# Patient Record
Sex: MALE | Race: WHITE | NOT HISPANIC OR LATINO | URBAN - METROPOLITAN AREA
[De-identification: names, ages, dates, MRNs, and addresses within clinical notes are randomized per-mention and may not be internally consistent; named-entity substitution may affect disease eponyms.]

---

## 2018-08-08 ENCOUNTER — OUTPATIENT (OUTPATIENT)
Dept: OUTPATIENT SERVICES | Facility: HOSPITAL | Age: 78
LOS: 1 days | End: 2018-08-08

## 2019-03-25 ENCOUNTER — OUTPATIENT (OUTPATIENT)
Dept: OUTPATIENT SERVICES | Facility: HOSPITAL | Age: 79
LOS: 1 days | End: 2019-03-25

## 2019-12-27 ENCOUNTER — OUTPATIENT (OUTPATIENT)
Dept: OUTPATIENT SERVICES | Facility: HOSPITAL | Age: 79
LOS: 1 days | End: 2019-12-27

## 2020-02-03 ENCOUNTER — APPOINTMENT (OUTPATIENT)
Dept: RADIOLOGY | Facility: CLINIC | Age: 80
End: 2020-02-03
Payer: MEDICARE

## 2020-02-03 PROCEDURE — 71046 X-RAY EXAM CHEST 2 VIEWS: CPT

## 2020-02-06 ENCOUNTER — OUTPATIENT (OUTPATIENT)
Dept: OUTPATIENT SERVICES | Facility: HOSPITAL | Age: 80
LOS: 1 days | End: 2020-02-06

## 2020-06-23 ENCOUNTER — OUTPATIENT (OUTPATIENT)
Dept: OUTPATIENT SERVICES | Facility: HOSPITAL | Age: 80
LOS: 1 days | End: 2020-06-23

## 2020-07-22 ENCOUNTER — OUTPATIENT (OUTPATIENT)
Dept: OUTPATIENT SERVICES | Facility: HOSPITAL | Age: 80
LOS: 1 days | End: 2020-07-22

## 2020-08-02 ENCOUNTER — INPATIENT (INPATIENT)
Facility: HOSPITAL | Age: 80
LOS: 0 days | Discharge: HOME | End: 2020-08-03
Attending: STUDENT IN AN ORGANIZED HEALTH CARE EDUCATION/TRAINING PROGRAM | Admitting: EMERGENCY MEDICINE
Payer: MEDICARE

## 2020-08-02 ENCOUNTER — OUTPATIENT (OUTPATIENT)
Dept: OUTPATIENT SERVICES | Facility: HOSPITAL | Age: 80
LOS: 1 days | End: 2020-08-02

## 2020-08-02 PROCEDURE — 99284 EMERGENCY DEPT VISIT MOD MDM: CPT | Mod: CS

## 2020-08-02 PROCEDURE — 71045 X-RAY EXAM CHEST 1 VIEW: CPT | Mod: 26

## 2020-08-02 PROCEDURE — 71275 CT ANGIOGRAPHY CHEST: CPT | Mod: 26

## 2020-08-02 PROCEDURE — 74175 CTA ABDOMEN W/CONTRAST: CPT | Mod: 26

## 2020-08-03 ENCOUNTER — OUTPATIENT (OUTPATIENT)
Dept: OUTPATIENT SERVICES | Facility: HOSPITAL | Age: 80
LOS: 1 days | End: 2020-08-03

## 2020-09-30 ENCOUNTER — OUTPATIENT (OUTPATIENT)
Dept: OUTPATIENT SERVICES | Facility: HOSPITAL | Age: 80
LOS: 1 days | End: 2020-09-30

## 2020-11-16 ENCOUNTER — APPOINTMENT (OUTPATIENT)
Dept: UROLOGY | Facility: CLINIC | Age: 80
End: 2020-11-16

## 2020-11-16 PROBLEM — Z00.00 ENCOUNTER FOR PREVENTIVE HEALTH EXAMINATION: Status: ACTIVE | Noted: 2020-11-16

## 2020-11-18 ENCOUNTER — APPOINTMENT (OUTPATIENT)
Dept: UROLOGY | Facility: CLINIC | Age: 80
End: 2020-11-18
Payer: MEDICARE

## 2020-11-18 ENCOUNTER — APPOINTMENT (OUTPATIENT)
Dept: FAMILY MEDICINE | Facility: CLINIC | Age: 80
End: 2020-11-18
Payer: MEDICARE

## 2020-11-18 VITALS
HEART RATE: 78 BPM | WEIGHT: 180 LBS | DIASTOLIC BLOOD PRESSURE: 86 MMHG | HEIGHT: 70 IN | BODY MASS INDEX: 25.77 KG/M2 | SYSTOLIC BLOOD PRESSURE: 152 MMHG

## 2020-11-18 VITALS — TEMPERATURE: 97.4 F

## 2020-11-18 DIAGNOSIS — Z87.898 PERSONAL HISTORY OF OTHER SPECIFIED CONDITIONS: ICD-10-CM

## 2020-11-18 DIAGNOSIS — Z86.79 PERSONAL HISTORY OF OTHER DISEASES OF THE CIRCULATORY SYSTEM: ICD-10-CM

## 2020-11-18 DIAGNOSIS — Z87.09 PERSONAL HISTORY OF OTHER DISEASES OF THE RESPIRATORY SYSTEM: ICD-10-CM

## 2020-11-18 DIAGNOSIS — Z80.42 FAMILY HISTORY OF MALIGNANT NEOPLASM OF PROSTATE: ICD-10-CM

## 2020-11-18 LAB
BILIRUB UR QL STRIP: NEGATIVE
CLARITY UR: CLEAR
COLLECTION METHOD: NORMAL
GLUCOSE UR-MCNC: NEGATIVE
HCG UR QL: 0.2 EU/DL
HGB UR QL STRIP.AUTO: NEGATIVE
KETONES UR-MCNC: NEGATIVE
LEUKOCYTE ESTERASE UR QL STRIP: NORMAL
NITRITE UR QL STRIP: NEGATIVE
PH UR STRIP: 6
PROT UR STRIP-MCNC: NEGATIVE
SP GR UR STRIP: 1.02

## 2020-11-18 PROCEDURE — 99204 OFFICE O/P NEW MOD 45 MIN: CPT | Mod: 25

## 2020-11-18 PROCEDURE — 81003 URINALYSIS AUTO W/O SCOPE: CPT | Mod: QW

## 2020-11-18 PROCEDURE — 36415 COLL VENOUS BLD VENIPUNCTURE: CPT

## 2020-11-18 NOTE — REVIEW OF SYSTEMS
[Shortness Of Breath] : shortness of breath [Wheezing] : wheezing [Genital bacterial infection] : genital bacterial infection [Genital yeast infection] : genital yeast infection [Sexually Transmitted Disease (STD)] : sexually transmitted disease [Poor quality erections] : Poor quality erections [Wake up at night to urinate  How many times?  ___] : wakes up to urinate [unfilled] times during the night [Strong urge to urinate] : strong urge to urinate [Interrupted urine stream] : interrupted urine stream [Negative] : Heme/Lymph [see HPI] : see HPI

## 2020-11-18 NOTE — LETTER BODY
[Dear  ___] : Dear  [unfilled], [Consult Letter:] : I had the pleasure of evaluating your patient, [unfilled]. [Please see my note below.] : Please see my note below. [Consult Closing:] : Thank you very much for allowing me to participate in the care of this patient.  If you have any questions, please do not hesitate to contact me. [Sincerely,] : Sincerely, [FreeTextEntry3] : Miguel Sheehan, DO\par Genitourinary Medicine\par

## 2020-11-18 NOTE — HISTORY OF PRESENT ILLNESS
[FreeTextEntry1] : Mr. CARRIE CHAVARRIA 80 year old  M  PMH COPD, GERD, HTN  and PSH aortic aneurysm, lung cancer s/p lobectomy. Pt sent by PCP for elevated for PSA. Pt had prostate biopsy about 30 years ago which was negative. Pt having urinary freq. Pt urinates 5-6 times a day. Nocturia x3. Stream varies. Has some intermittency. Denies hesitancy. Positive urgency. UA shows no signs of infection. 3/5 brothers have prostate cancer. Oldest diagnosed at 76 and youngest diagnosed in 60's. Former smoker. Stopped 25 years ago. Smoked for about 15-20 years. \par \par 11/4/19 PSA 6.0\par 9/30/20 PSA 7.62

## 2020-11-18 NOTE — PHYSICAL EXAM
[General Appearance - Well Developed] : well developed [General Appearance - Well Nourished] : well nourished [Normal Appearance] : normal appearance [Well Groomed] : well groomed [General Appearance - In No Acute Distress] : no acute distress [Edema] : no peripheral edema [Respiration, Rhythm And Depth] : normal respiratory rhythm and effort [Exaggerated Use Of Accessory Muscles For Inspiration] : no accessory muscle use [Abdomen Soft] : soft [Abdomen Tenderness] : non-tender [Costovertebral Angle Tenderness] : no ~M costovertebral angle tenderness [Urethral Meatus] : meatus normal [Penis Abnormality] : normal circumcised penis [Urinary Bladder Findings] : the bladder was normal on palpation [Scrotum] : the scrotum was normal [Testes Mass (___cm)] : there were no testicular masses [Prostate Tenderness] : the prostate was not tender [No Prostate Nodules] : no prostate nodules [Prostate Size ___ gm] : prostate size [unfilled] gm [Normal Station and Gait] : the gait and station were normal for the patient's age [] : no rash [No Focal Deficits] : no focal deficits [Oriented To Time, Place, And Person] : oriented to person, place, and time [Affect] : the affect was normal [Mood] : the mood was normal [Not Anxious] : not anxious

## 2020-11-20 ENCOUNTER — NON-APPOINTMENT (OUTPATIENT)
Age: 80
End: 2020-11-20

## 2020-11-20 LAB
PSA FREE FLD-MCNC: 25 %
PSA FREE SERPL-MCNC: 1.97 NG/ML
PSA SERPL-MCNC: 7.88 NG/ML

## 2020-12-08 ENCOUNTER — OUTPATIENT (OUTPATIENT)
Dept: OUTPATIENT SERVICES | Facility: HOSPITAL | Age: 80
LOS: 1 days | End: 2020-12-08
Payer: MEDICARE

## 2020-12-08 ENCOUNTER — RESULT REVIEW (OUTPATIENT)
Age: 80
End: 2020-12-08

## 2020-12-08 ENCOUNTER — APPOINTMENT (OUTPATIENT)
Dept: MRI IMAGING | Facility: CLINIC | Age: 80
End: 2020-12-08
Payer: MEDICARE

## 2020-12-08 DIAGNOSIS — R97.20 ELEVATED PROSTATE SPECIFIC ANTIGEN [PSA]: ICD-10-CM

## 2020-12-08 PROCEDURE — 72197 MRI PELVIS W/O & W/DYE: CPT | Mod: 26

## 2020-12-08 PROCEDURE — 76377 3D RENDER W/INTRP POSTPROCES: CPT | Mod: 26

## 2020-12-08 PROCEDURE — 76377 3D RENDER W/INTRP POSTPROCES: CPT

## 2020-12-08 PROCEDURE — 72197 MRI PELVIS W/O & W/DYE: CPT

## 2020-12-08 PROCEDURE — A9585: CPT

## 2020-12-16 ENCOUNTER — APPOINTMENT (OUTPATIENT)
Dept: UROLOGY | Facility: CLINIC | Age: 80
End: 2020-12-16
Payer: MEDICARE

## 2020-12-16 VITALS
BODY MASS INDEX: 25.77 KG/M2 | HEIGHT: 70 IN | TEMPERATURE: 97.3 F | WEIGHT: 180 LBS | HEART RATE: 65 BPM | SYSTOLIC BLOOD PRESSURE: 127 MMHG | DIASTOLIC BLOOD PRESSURE: 76 MMHG

## 2020-12-16 PROCEDURE — 99213 OFFICE O/P EST LOW 20 MIN: CPT

## 2020-12-16 NOTE — ASSESSMENT
[FreeTextEntry1] : Pt does not wish to have biopsy at this time and would like to watch his PSA instead. Long discussion discussed with pt about biopsy vs repeating PSA. \par \par Plan\par PSA 3 months\par fu 3 months

## 2021-01-14 ENCOUNTER — RX RENEWAL (OUTPATIENT)
Age: 81
End: 2021-01-14

## 2021-01-14 RX ORDER — TAMSULOSIN HYDROCHLORIDE 0.4 MG/1
0.4 CAPSULE ORAL
Qty: 30 | Refills: 3 | Status: ACTIVE | COMMUNITY
Start: 2020-11-18 | End: 1900-01-01

## 2021-02-04 ENCOUNTER — OUTPATIENT (OUTPATIENT)
Dept: OUTPATIENT SERVICES | Facility: HOSPITAL | Age: 81
LOS: 1 days | End: 2021-02-04

## 2021-03-09 ENCOUNTER — OUTPATIENT (OUTPATIENT)
Dept: OUTPATIENT SERVICES | Facility: HOSPITAL | Age: 81
LOS: 1 days | End: 2021-03-09

## 2021-03-16 ENCOUNTER — APPOINTMENT (OUTPATIENT)
Dept: UROLOGY | Facility: CLINIC | Age: 81
End: 2021-03-16

## 2021-03-26 ENCOUNTER — APPOINTMENT (OUTPATIENT)
Dept: UROLOGY | Facility: CLINIC | Age: 81
End: 2021-03-26
Payer: MEDICARE

## 2021-03-26 VITALS
SYSTOLIC BLOOD PRESSURE: 165 MMHG | TEMPERATURE: 97.3 F | DIASTOLIC BLOOD PRESSURE: 74 MMHG | HEART RATE: 54 BPM | HEIGHT: 70 IN | BODY MASS INDEX: 32.93 KG/M2 | WEIGHT: 230 LBS

## 2021-03-26 DIAGNOSIS — N53.14 RETROGRADE EJACULATION: ICD-10-CM

## 2021-03-26 PROCEDURE — 99214 OFFICE O/P EST MOD 30 MIN: CPT

## 2021-03-26 NOTE — HISTORY OF PRESENT ILLNESS
[FreeTextEntry1] : Pt comes in follow up elevated PSA and MRI of prostate. 3 brothers had prostate cancer. Pt is happy with his urination on flomax. Pt having symptoms of retrograde ejaculation but this is not bothersome to the pt. Pt has not repeated his PSA yet. Pt gets flushing from taking viagra. \par \par 11/4/19 PSA 6.0\par 9/30/20 PSA 7.62 \par 11/18/20 PSA 7.88 % free 25 30% risk of cancer\par 12/8/20 MRI shows PIRADS 3 lesion Prostate 79 cc\par

## 2021-03-26 NOTE — ASSESSMENT
[FreeTextEntry1] : Pt still does not wish to have biopsy at this time and would like to watch his PSA instead. Long discussion discussed with pt about biopsy vs repeating PSA. \par \par Plan\par follow up with cardiology prior to starting viagra\par c/w flomax\par PSA \par fu 1 month

## 2021-04-13 ENCOUNTER — APPOINTMENT (OUTPATIENT)
Dept: UROLOGY | Facility: CLINIC | Age: 81
End: 2021-04-13
Payer: MEDICARE

## 2021-04-13 VITALS
HEART RATE: 57 BPM | DIASTOLIC BLOOD PRESSURE: 59 MMHG | HEIGHT: 70 IN | WEIGHT: 230 LBS | SYSTOLIC BLOOD PRESSURE: 121 MMHG | TEMPERATURE: 97.8 F | BODY MASS INDEX: 32.93 KG/M2

## 2021-04-13 DIAGNOSIS — N52.9 MALE ERECTILE DYSFUNCTION, UNSPECIFIED: ICD-10-CM

## 2021-04-13 PROCEDURE — 99214 OFFICE O/P EST MOD 30 MIN: CPT

## 2021-04-13 NOTE — HISTORY OF PRESENT ILLNESS
[FreeTextEntry1] : Pt comes in follow up elevated PSA. \par \par 11/4/19 PSA 6.0\par 9/30/20 PSA 7.62 \par 11/18/20 PSA 7.88 % free 25 30% risk of cancer\par 12/8/20 MRI shows PIRADS 3 lesion Prostate 79 cc\par 3/29/21 PSA 6.4 %free 24

## 2021-04-13 NOTE — ASSESSMENT
[FreeTextEntry1] : Pt does not wish to have biopsy at this time and would like to watch his PSA instead. Long discussion discussed with pt about biopsy vs repeating PSA. \par \par Plan\par fu with cardio about starting sildenafil\par c/w flomax\par PSA 3 months\par fu 3 months

## 2021-04-15 RX ORDER — SILDENAFIL 20 MG/1
20 TABLET ORAL DAILY
Qty: 30 | Refills: 3 | Status: ACTIVE | COMMUNITY
Start: 2021-04-15 | End: 1900-01-01

## 2021-04-17 ENCOUNTER — OUTPATIENT (OUTPATIENT)
Dept: OUTPATIENT SERVICES | Facility: HOSPITAL | Age: 81
LOS: 1 days | End: 2021-04-17

## 2021-04-18 ENCOUNTER — INPATIENT (INPATIENT)
Facility: HOSPITAL | Age: 81
LOS: 1 days | Discharge: HOME | End: 2021-04-20
Payer: MEDICARE

## 2021-04-18 ENCOUNTER — OUTPATIENT (OUTPATIENT)
Dept: OUTPATIENT SERVICES | Facility: HOSPITAL | Age: 81
LOS: 1 days | End: 2021-04-18

## 2021-04-18 PROCEDURE — 99285 EMERGENCY DEPT VISIT HI MDM: CPT | Mod: CS

## 2021-04-18 PROCEDURE — 93010 ELECTROCARDIOGRAM REPORT: CPT

## 2021-04-18 PROCEDURE — 71275 CT ANGIOGRAPHY CHEST: CPT | Mod: 26

## 2021-04-18 PROCEDURE — 76700 US EXAM ABDOM COMPLETE: CPT | Mod: 26

## 2021-04-18 PROCEDURE — 70450 CT HEAD/BRAIN W/O DYE: CPT | Mod: 26

## 2021-04-18 PROCEDURE — 71045 X-RAY EXAM CHEST 1 VIEW: CPT | Mod: 26

## 2021-04-18 PROCEDURE — 93880 EXTRACRANIAL BILAT STUDY: CPT | Mod: 26

## 2021-04-19 ENCOUNTER — OUTPATIENT (OUTPATIENT)
Dept: OUTPATIENT SERVICES | Facility: HOSPITAL | Age: 81
LOS: 1 days | End: 2021-04-19

## 2021-04-20 ENCOUNTER — OUTPATIENT (OUTPATIENT)
Dept: OUTPATIENT SERVICES | Facility: HOSPITAL | Age: 81
LOS: 1 days | End: 2021-04-20

## 2021-04-22 ENCOUNTER — APPOINTMENT (OUTPATIENT)
Dept: ORTHOPEDIC SURGERY | Facility: CLINIC | Age: 81
End: 2021-04-22
Payer: MEDICARE

## 2021-04-22 VITALS — HEIGHT: 70 IN | WEIGHT: 230 LBS | TEMPERATURE: 98.4 F | BODY MASS INDEX: 32.93 KG/M2

## 2021-04-22 DIAGNOSIS — M65.4 RADIAL STYLOID TENOSYNOVITIS [DE QUERVAIN]: ICD-10-CM

## 2021-04-22 DIAGNOSIS — M18.12 UNILATERAL PRIMARY OSTEOARTHRITIS OF FIRST CARPOMETACARPAL JOINT, LEFT HAND: ICD-10-CM

## 2021-04-22 PROCEDURE — 73110 X-RAY EXAM OF WRIST: CPT | Mod: LT

## 2021-04-22 PROCEDURE — 20550 NJX 1 TENDON SHEATH/LIGAMENT: CPT | Mod: LT

## 2021-04-22 PROCEDURE — 99204 OFFICE O/P NEW MOD 45 MIN: CPT | Mod: 25

## 2021-04-22 PROCEDURE — 73130 X-RAY EXAM OF HAND: CPT | Mod: LT

## 2021-04-22 RX ADMIN — Medication %: at 00:00

## 2021-04-22 RX ADMIN — BETAMETHASONE ACETATE AND BETAMETHASONE SODIUM PHOSPHATE 6MG/ML PRESERVATIVE FREE MG/ML: 3; 3 INJECTION, SUSPENSION EPIDURAL; INTRAMUSCULAR; INTRASPINAL; INTRATHECAL at 00:00

## 2021-04-22 NOTE — ADDENDUM
[FreeTextEntry1] : This note was written by Sandrine Salcido on 04/22/2021 acting solely as a scribe for Dr. Carlos Field.\par \par All medical record entries made by the scribe were at my, Dr. Carlos Field, direction and personally dictated by me on 04/22/2021. I have personally reviewed the chart and agree that the record accurately reflects my personal performance of the history, physical exam, assessment, and plan

## 2021-04-22 NOTE — PROCEDURE
[FreeTextEntry1] : -  After a discussion of risks and benefits, the patient agreed to proceed with a cortisone injection.  \par -  Side: Left first dorsal compartment.\par -  Medications injected: 1 cc of 1% Lidocaine and 1 cc of Celestone Soluspan, 6mg/cc, using sterile technique.\par -  Patient tolerated the procedure well, without complications.\par -  Immediate improvement of the symptoms, secondary to the anesthetic effects of the injection, was noted.\par -  Instructions:  The patient was told that the symptoms should hopefully begin to improve within the next several days. The use of ice, anti-inflammatory agents or Tylenol, and  activity modification was discussed. \par -  Follow-up: Within 4 weeks to assess response to the injection.

## 2021-04-22 NOTE — HISTORY OF PRESENT ILLNESS
[Right] : right hand dominant [FreeTextEntry1] : He comes in today for evaluation of left thumb pain which started about 2 months ago. He notes his pain is most significant in the morning. He also has stiffness in his thumb. He denies injury or any prior treatment. He rates his pain a 1 out of 10 at this time. \par \par He has a history of lung cancer and uses oxygen. He notes the pain is worse in the morning.\par \par He was referred by Dr. Murguia and Dr. Rodríguez.

## 2021-04-22 NOTE — PHYSICAL EXAM
[de-identified] : -Constitutional: This is a male in not obvious distress.\par -Psych: Patient is alert and oriented to person, place and time. Patient has a normal mood and affect.\par -Cardiovascular: Normal pulses throughout the upper extremities. No significant varicosities are noted in the upper extremities.\par -Neuro: Strength and sensation are intact throughout the upper extremities. Patient has normal coordination.\par -Respiratory: Patient exhibits no evidence of shortness of breath or difficulty breathing.\par -Skin: No rashes, lesions, or other abnormalities are noted in the upper extremities.\par \par ---\par \par Examination of his left thumb and hand demonstrates no swelling or tenderness along the A1 pulley or evidence of a trigger finger.  There is some swelling and tenderness along the CMC joint of the thumb.  However, he is more tender along the first dorsal compartment.  He has a positive Finkelstein sign.  There is a palpable cyst along the first dorsal compartment.  He is neurovascularly intact distally. 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

## 2021-04-22 NOTE — CONSULT LETTER
[Dear  ___] : Dear  [unfilled], [Consult Letter:] : I had the pleasure of evaluating your patient, [unfilled]. [Please see my note below.] : Please see my note below. [Consult Closing:] : Thank you very much for allowing me to participate in the care of this patient.  If you have any questions, please do not hesitate to contact me. [Sincerely,] : Sincerely, [DrOrion  ___] : Dr. BAER [FreeTextEntry3] : Carlos Field M.D.\par Surgery of the Hand and Upper Extremity\par Orthopaedic Surgery\par Chief, Hand Service, [facility]\par Director, Hand Service, \par  of Orthopedic Surgery, [facility] [employer] at [facility]\par [email or web address]: [email or web address] Office Phone: [phone]

## 2021-04-22 NOTE — DISCUSSION/SUMMARY
[FreeTextEntry1] : He has findings consistent with left thumb and wrist pain secondary to de Quervain's tendinitis.  He does also has symptoms secondary to CMC joint arthritis, but I do believe that the majority of his symptoms are secondary to the de Quervain's tendinitis.\par \par I had a discussion regarding today's visit, the diagnosis, and treatment recommendations / options. At this time, we discussed observation of his symptoms, a cortisone injection or surgery. He agreed to proceed with a cortisone injection at the first dorsal compartment.\par \par The patient has agreed to this plan of management and has expressed full understanding.  All questions were fully answered to the patient's satisfaction.\par \par I time spent on this visit included: Preparation for the visit, review of the medical records, review of pertinent diagnostic studies, examination and counseling of the patient on the above diagnosis, treatment plan and prognosis, orders of diagnostic tests, medication and/or appropriate procedures and documentation in the medical records of today's visit.

## 2021-04-27 ENCOUNTER — NON-APPOINTMENT (OUTPATIENT)
Age: 81
End: 2021-04-27

## 2021-07-13 ENCOUNTER — APPOINTMENT (OUTPATIENT)
Dept: UROLOGY | Facility: CLINIC | Age: 81
End: 2021-07-13
Payer: MEDICARE

## 2021-07-13 VITALS
SYSTOLIC BLOOD PRESSURE: 152 MMHG | HEIGHT: 70 IN | DIASTOLIC BLOOD PRESSURE: 66 MMHG | HEART RATE: 56 BPM | TEMPERATURE: 97.3 F | BODY MASS INDEX: 32.93 KG/M2 | WEIGHT: 230 LBS

## 2021-07-13 PROCEDURE — 99214 OFFICE O/P EST MOD 30 MIN: CPT

## 2021-07-13 NOTE — ASSESSMENT
[FreeTextEntry1] : Pt does not wish to have biopsy at this time and would like to watch his PSA instead. Long discussion discussed with pt about biopsy vs repeating PSA. Also discussed repeating MRI and at this time wants to wait 3 months\par \par Plan\par c/w flomax\par PSA 3 months\par fu 3 months

## 2021-07-13 NOTE — HISTORY OF PRESENT ILLNESS
[FreeTextEntry1] : Pt comes in follow up elevated PSA. \par \par 11/4/19 PSA 6.0\par 9/30/20 PSA 7.62 \par 11/18/20 PSA 7.88 % free 25 30% risk of cancer\par 12/8/20 MRI shows PIRADS 3 lesion Prostate 79 cc\par 3/29/21 PSA 6.4 %free 24\par 7/9/21 PSA 5.5 %free 21

## 2021-10-13 ENCOUNTER — NON-APPOINTMENT (OUTPATIENT)
Age: 81
End: 2021-10-13

## 2021-10-19 ENCOUNTER — APPOINTMENT (OUTPATIENT)
Dept: UROLOGY | Facility: CLINIC | Age: 81
End: 2021-10-19
Payer: MEDICARE

## 2021-10-19 VITALS
BODY MASS INDEX: 32.93 KG/M2 | TEMPERATURE: 97.8 F | HEART RATE: 83 BPM | SYSTOLIC BLOOD PRESSURE: 142 MMHG | DIASTOLIC BLOOD PRESSURE: 77 MMHG | WEIGHT: 230 LBS | HEIGHT: 70 IN

## 2021-10-19 DIAGNOSIS — R97.20 ELEVATED PROSTATE, SPECIFIC ANTIGEN [PSA]: ICD-10-CM

## 2021-10-19 PROCEDURE — 99214 OFFICE O/P EST MOD 30 MIN: CPT

## 2021-10-19 NOTE — HISTORY OF PRESENT ILLNESS
[FreeTextEntry1] : Pt comes in follow up elevated PSA. \par \par 11/4/19 PSA 6.0\par 9/30/20 PSA 7.62 \par 11/18/20 PSA 7.88 % free 25 30% risk of cancer\par 12/8/20 MRI shows PIRADS 3 lesion Prostate 79 cc\par 3/29/21 PSA 6.4 %free 24\par 7/9/21 PSA 5.5 %free 21\par 10/12/21 PSA  7.5 %free 17.6

## 2021-11-09 ENCOUNTER — OUTPATIENT (OUTPATIENT)
Dept: OUTPATIENT SERVICES | Facility: HOSPITAL | Age: 81
LOS: 1 days | End: 2021-11-09
Payer: MEDICARE

## 2021-11-09 ENCOUNTER — RESULT REVIEW (OUTPATIENT)
Age: 81
End: 2021-11-09

## 2021-11-09 ENCOUNTER — APPOINTMENT (OUTPATIENT)
Dept: MRI IMAGING | Facility: CLINIC | Age: 81
End: 2021-11-09
Payer: MEDICARE

## 2021-11-09 DIAGNOSIS — R97.20 ELEVATED PROSTATE SPECIFIC ANTIGEN [PSA]: ICD-10-CM

## 2021-11-09 PROCEDURE — A9585: CPT

## 2021-11-09 PROCEDURE — 76377 3D RENDER W/INTRP POSTPROCES: CPT

## 2021-11-09 PROCEDURE — 72197 MRI PELVIS W/O & W/DYE: CPT | Mod: 26,MG

## 2021-11-09 PROCEDURE — 76377 3D RENDER W/INTRP POSTPROCES: CPT | Mod: 26

## 2021-11-09 PROCEDURE — G1004: CPT

## 2021-11-09 PROCEDURE — 72197 MRI PELVIS W/O & W/DYE: CPT | Mod: MG

## 2021-11-11 ENCOUNTER — APPOINTMENT (OUTPATIENT)
Dept: UROLOGY | Facility: CLINIC | Age: 81
End: 2021-11-11

## 2021-11-18 ENCOUNTER — APPOINTMENT (OUTPATIENT)
Dept: RADIOLOGY | Facility: CLINIC | Age: 81
End: 2021-11-18

## 2021-11-18 ENCOUNTER — APPOINTMENT (OUTPATIENT)
Dept: RADIOLOGY | Facility: CLINIC | Age: 81
End: 2021-11-18
Payer: MEDICARE

## 2021-11-18 ENCOUNTER — APPOINTMENT (OUTPATIENT)
Dept: ULTRASOUND IMAGING | Facility: CLINIC | Age: 81
End: 2021-11-18
Payer: MEDICARE

## 2021-11-18 PROCEDURE — 93970 EXTREMITY STUDY: CPT

## 2021-11-18 PROCEDURE — 72050 X-RAY EXAM NECK SPINE 4/5VWS: CPT

## 2021-11-18 PROCEDURE — 72110 X-RAY EXAM L-2 SPINE 4/>VWS: CPT

## 2021-11-18 PROCEDURE — 93925 LOWER EXTREMITY STUDY: CPT

## 2021-11-23 ENCOUNTER — APPOINTMENT (OUTPATIENT)
Dept: UROLOGY | Facility: CLINIC | Age: 81
End: 2021-11-23
Payer: MEDICARE

## 2021-11-23 VITALS
BODY MASS INDEX: 32.93 KG/M2 | HEIGHT: 70 IN | DIASTOLIC BLOOD PRESSURE: 96 MMHG | SYSTOLIC BLOOD PRESSURE: 168 MMHG | HEART RATE: 77 BPM | TEMPERATURE: 97.7 F | WEIGHT: 230 LBS

## 2021-11-23 PROCEDURE — 99214 OFFICE O/P EST MOD 30 MIN: CPT

## 2021-11-23 NOTE — ASSESSMENT
[FreeTextEntry1] :  81  year old male with COPD on an O2 machine, hx of lung cancer followed with [facility] [place], and a hx of elevated PSA of: \par 7/9/21 PSA 5.5 %free 21\par 10/12/21 PSA  7.5 %free 17.6\par Had biopsy prostate 30 years ago: neg for cancer\par MRI 2021:\par Pros volume: 62 cc // PSA density: 0.12\par PIRADS 3 lesion right posterior: no change since 12/8/2020\par RAMONA: smooth, 4x4, no real nodules\par ++ Family History of prostate cancer in brothers\par \par Plan: \par Due to patient's frailty, comorbidities, COPD, and morbidities:\par Discontinue all investigations regarding prostate cancer. No need for biopsy or MRI's.\par this PSA may well be volume dirven due to BPH\par continue tamsulosin\par PSA in 1 year and RTC\par \par \par

## 2021-11-23 NOTE — HISTORY OF PRESENT ILLNESS
[FreeTextEntry1] : 82 yo male Pt comes in as a referral for follow up of elevated PSA. \par \par 11/4/19 PSA 6.0\par 9/30/20 PSA 7.62 \par 11/18/20 PSA 7.88 % free 25 30% risk of cancer\par 12/8/20 MRI shows PIRADS 3 lesion Prostate 79 cc\par 3/29/21 PSA 6.4 %free 24\par 7/9/21 PSA 5.5 %free 21\par 10/12/21 PSA  7.5 %free 17.6\par \par Had biopsy prostate 30 years ago: neg for cancer\par MRI 2021:\par Pros volume: 62 cc // PSA density: 0.12\par PIRADS 3 lesion right posterior: no change since 12/8/2020

## 2022-04-14 ENCOUNTER — APPOINTMENT (OUTPATIENT)
Dept: CT IMAGING | Facility: CLINIC | Age: 82
End: 2022-04-14
Payer: MEDICARE

## 2022-04-14 PROCEDURE — 70498 CT ANGIOGRAPHY NECK: CPT | Mod: MH

## 2022-04-14 PROCEDURE — 70496 CT ANGIOGRAPHY HEAD: CPT | Mod: MH

## 2022-04-14 PROCEDURE — 70481 CT ORBIT/EAR/FOSSA W/DYE: CPT | Mod: MH

## 2022-10-07 ENCOUNTER — TRANSCRIPTION ENCOUNTER (OUTPATIENT)
Age: 82
End: 2022-10-07

## 2022-10-08 ENCOUNTER — OUTPATIENT (OUTPATIENT)
Dept: OUTPATIENT SERVICES | Facility: HOSPITAL | Age: 82
LOS: 1 days | End: 2022-10-08

## 2022-10-08 DIAGNOSIS — U07.1 COVID-19: ICD-10-CM

## 2022-11-28 ENCOUNTER — APPOINTMENT (OUTPATIENT)
Dept: UROLOGY | Facility: CLINIC | Age: 82
End: 2022-11-28

## 2023-01-18 PROBLEM — H53.023 AMBLYOPIA REFRACTIVE; BOTH EYES: Status: ACTIVE | Noted: 2023-01-18

## 2023-01-30 ENCOUNTER — OFFICE (OUTPATIENT)
Dept: URBAN - METROPOLITAN AREA CLINIC 38 | Facility: CLINIC | Age: 83
Setting detail: OPHTHALMOLOGY
End: 2023-01-30
Payer: MEDICARE

## 2023-01-30 DIAGNOSIS — H35.54: ICD-10-CM

## 2023-01-30 DIAGNOSIS — H02.831: ICD-10-CM

## 2023-01-30 DIAGNOSIS — H16.223: ICD-10-CM

## 2023-01-30 DIAGNOSIS — H11.153: ICD-10-CM

## 2023-01-30 DIAGNOSIS — H01.001: ICD-10-CM

## 2023-01-30 DIAGNOSIS — H26.491: ICD-10-CM

## 2023-01-30 DIAGNOSIS — H50.10: ICD-10-CM

## 2023-01-30 DIAGNOSIS — H01.004: ICD-10-CM

## 2023-01-30 DIAGNOSIS — H02.834: ICD-10-CM

## 2023-01-30 DIAGNOSIS — H35.033: ICD-10-CM

## 2023-01-30 DIAGNOSIS — H35.40: ICD-10-CM

## 2023-01-30 DIAGNOSIS — H43.813: ICD-10-CM

## 2023-01-30 PROBLEM — L71.0 ROSACEA: Status: ACTIVE | Noted: 2023-01-30

## 2023-01-30 PROBLEM — H18.413 ARCUS SENILIS; BOTH EYES: Status: ACTIVE | Noted: 2023-01-30

## 2023-01-30 PROBLEM — H57.813 BROW PTOSIS; BOTH EYES: Status: ACTIVE | Noted: 2023-01-30

## 2023-01-30 PROCEDURE — 92014 COMPRE OPH EXAM EST PT 1/>: CPT | Performed by: OPHTHALMOLOGY

## 2023-01-30 ASSESSMENT — REFRACTION_MANIFEST
OS_VA2: 20/20(J1+)
OD_SPHERE: +0.25
OD_ADD: +3.00
OD_VA2: 20/20(J1+)
OS_SPHERE: PLANO
OS_VA1: 20/20-
OU_VA: 20/20
OD_AXIS: 140
OS_CYLINDER: -0.25
OD_VA1: 20/20-
OD_CYLINDER: -0.50
OS_ADD: +3.00
OS_AXIS: 090

## 2023-01-30 ASSESSMENT — KERATOMETRY
OS_AXISANGLE_DEGREES: 083
OD_AXISANGLE_DEGREES: 090
OS_K2POWER_DIOPTERS: 46.50
OS_K1POWER_DIOPTERS: 45.00
OD_K2POWER_DIOPTERS: 45.75
METHOD_AUTO_MANUAL: AUTO
OD_K1POWER_DIOPTERS: 44.75

## 2023-01-30 ASSESSMENT — REFRACTION_CURRENTRX
OD_VPRISM_DIRECTION: SV
OS_SPHERE: +2.50
OS_OVR_VA: 20/
OD_SPHERE: +2.50
OS_VPRISM_DIRECTION: SV
OD_OVR_VA: 20/

## 2023-01-30 ASSESSMENT — AXIALLENGTH_DERIVED
OS_AL: 22.7481
OD_AL: 22.8742
OD_AL: 22.9662

## 2023-01-30 ASSESSMENT — SUPERFICIAL PUNCTATE KERATITIS (SPK)
OS_SPK: T
OD_SPK: T

## 2023-01-30 ASSESSMENT — LID EXAM ASSESSMENTS
OS_BLEPHARITIS: LUL 1+
OD_BLEPHARITIS: RUL 1+
OS_BROW_PTOSIS: HEAVY
OD_BROW_PTOSIS: HEAVY

## 2023-01-30 ASSESSMENT — REFRACTION_AUTOREFRACTION
OD_AXIS: 142
OS_SPHERE: +0.25
OD_SPHERE: +0.50
OD_CYLINDER: -0.50
OS_AXIS: 092
OS_CYLINDER: -0.25

## 2023-01-30 ASSESSMENT — TEAR BREAK UP TIME (TBUT)
OS_TBUT: 6-8 SEC
OD_TBUT: 6-8 SEC

## 2023-01-30 ASSESSMENT — SPHEQUIV_DERIVED
OS_SPHEQUIV: 0.125
OD_SPHEQUIV: 0
OD_SPHEQUIV: 0.25

## 2023-01-30 ASSESSMENT — LID POSITION - DERMATOCHALASIS
OD_DERMATOCHALASIS: RUL 2+
OS_DERMATOCHALASIS: LUL 2+

## 2023-01-30 ASSESSMENT — VISUAL ACUITY
OS_BCVA: 20/25
OD_BCVA: 20/20-2

## 2023-01-30 ASSESSMENT — CONFRONTATIONAL VISUAL FIELD TEST (CVF)
OD_FINDINGS: FULL
OS_FINDINGS: FULL

## 2023-02-13 ENCOUNTER — OFFICE (OUTPATIENT)
Dept: URBAN - METROPOLITAN AREA CLINIC 107 | Facility: CLINIC | Age: 83
Setting detail: OPHTHALMOLOGY
End: 2023-02-13
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
## 2023-02-13 PROCEDURE — NO SHOW FE NO SHOW FEE: Performed by: OPHTHALMOLOGY

## 2023-02-23 PROBLEM — Z96.1 PSEUDOPHAKIA; BOTH EYES: Status: ACTIVE | Noted: 2023-02-23

## 2023-02-28 ENCOUNTER — NON-APPOINTMENT (OUTPATIENT)
Age: 83
End: 2023-02-28

## 2023-02-28 DIAGNOSIS — R39.9 UNSPECIFIED SYMPTOMS AND SIGNS INVOLVING THE GENITOURINARY SYSTEM: ICD-10-CM

## 2023-02-28 NOTE — PHYSICAL EXAM
[General Appearance - Well Developed] : well developed [General Appearance - Well Nourished] : well nourished [Normal Appearance] : normal appearance [Well Groomed] : well groomed [General Appearance - In No Acute Distress] : no acute distress [Abdomen Soft] : soft [Abdomen Tenderness] : non-tender [Costovertebral Angle Tenderness] : no ~M costovertebral angle tenderness [Urinary Bladder Findings] : the bladder was normal on palpation [FreeTextEntry1] : RAMONA: smooth, 4x4, no real nodules [Edema] : no peripheral edema [] : no respiratory distress [Respiration, Rhythm And Depth] : normal respiratory rhythm and effort [Exaggerated Use Of Accessory Muscles For Inspiration] : no accessory muscle use [Oriented To Time, Place, And Person] : oriented to person, place, and time [Affect] : the affect was normal [Mood] : the mood was normal [Not Anxious] : not anxious [Normal Station and Gait] : the gait and station were normal for the patient's age [No Focal Deficits] : no focal deficits

## 2023-02-28 NOTE — ASSESSMENT
[FreeTextEntry1] :  81  year old male with COPD on an O2 machine, [place] [facility] [place] and a hx of elevated PSA of: \par 7/9/21 PSA 5.5 %free 21\par 10/12/21 PSA  7.5 %free 17.6\par Had biopsy prostate 30 years ago: neg for cancer\par MRI 2021:\par Pros volume: 62 cc // PSA density: 0.12\par PIRADS 3 lesion right posterior: no change since 12/8/2020\par RAMONA: smooth, 4x4, no real nodules\par ++ Family History of prostate cancer in brothers\par \par Plan: \par Due to patient's frailty, comorbidities, COPD, and morbidities:\par Discontinue all investigations regarding prostate cancer. No need for biopsy or MRI's.\par this PSA may well be volume dirven due to BPH\par continue tamsulosin\par PSA in 1 year and RTC\par \par \par

## 2023-03-14 ENCOUNTER — APPOINTMENT (OUTPATIENT)
Dept: UROLOGY | Facility: CLINIC | Age: 83
End: 2023-03-14

## 2023-04-24 ENCOUNTER — TRANSCRIPTION ENCOUNTER (OUTPATIENT)
Age: 83
End: 2023-04-24

## 2023-04-25 ENCOUNTER — TRANSCRIPTION ENCOUNTER (OUTPATIENT)
Age: 83
End: 2023-04-25

## 2023-04-27 ENCOUNTER — TRANSCRIPTION ENCOUNTER (OUTPATIENT)
Age: 83
End: 2023-04-27

## 2023-05-02 ENCOUNTER — TRANSCRIPTION ENCOUNTER (OUTPATIENT)
Age: 83
End: 2023-05-02

## 2023-08-01 ENCOUNTER — APPOINTMENT (OUTPATIENT)
Dept: ULTRASOUND IMAGING | Facility: CLINIC | Age: 83
End: 2023-08-01
Payer: MEDICARE

## 2023-08-01 PROCEDURE — 93971 EXTREMITY STUDY: CPT | Mod: RT

## 2023-08-02 PROBLEM — H52.03 HYPEROPIA ; BOTH EYES: Status: ACTIVE | Noted: 2023-08-02

## 2024-04-25 ENCOUNTER — RESULT REVIEW (OUTPATIENT)
Age: 84
End: 2024-04-25

## 2024-04-26 ENCOUNTER — TRANSCRIPTION ENCOUNTER (OUTPATIENT)
Age: 84
End: 2024-04-26

## 2024-04-28 NOTE — ASSESSMENT
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   Functional Change: No marked change, working on yoga exercises.     Pain / Symptoms  - Pain rating (out of 10): Current: 6       OBJECTIVE                                                                                                                    Observation   Reduced guarding noted with transfers                    Treatment     Therapeutic Exercise  Nu Step level 3, 5 10 min   Transversus abdominus brace with ambulation   Verbal cues for positioning and performance     Manual Therapy   Soft tissue mobilization right hip musculature sustained pressures, trigger points releases  Right thoracolumbar tractions   Verbal cues for grading of manual treatment     Skilled input: verbal instruction/cues, tactile instruction/cues and as detailed above    Writer verbally educated and received verbal consent for hand placement, positioning of patient, and techniques to be performed today from patient for hand placement and palpation for techniques as described above and how they are pertinent to the patient's plan of care.  Home Exercise Program  Transversus abdominus brace holds, concentrated 2x day 3-4 min, 30-60 sec reps,& with one activity 10 min, & throughout day, no increase in pain   Core isometrics standing left and right, reverse left and right level 2 band.      ASSESSMENT                                                                                                            Patient demonstrates improved mobility.  [FreeTextEntry1] : Plan\par c/w flomax\par MRI Prostate\par fu 2 weeks Entering with lower pain levels. Signs and symptoms moderately irritable.   Pain/symptoms after session (out of 10): 7  Education:   - Results of above outlined education: Verbalizes understanding and Demonstrates understanding    PLAN                                                                                                                           Suggestions for next session as indicated: Progress per plan of care       Therapy procedure time and total treatment time can be found documented on the Time Entry flowsheet

## 2024-04-29 ENCOUNTER — TRANSCRIPTION ENCOUNTER (OUTPATIENT)
Age: 84
End: 2024-04-29

## 2024-07-15 PROBLEM — H35.033 HYPERTENSIVE RETINOPATHY; BOTH EYES: Status: ACTIVE | Noted: 2024-07-15

## 2024-07-25 ENCOUNTER — APPOINTMENT (OUTPATIENT)
Dept: RADIOLOGY | Facility: CLINIC | Age: 84
End: 2024-07-25
Payer: MEDICARE

## 2024-07-25 PROCEDURE — 71046 X-RAY EXAM CHEST 2 VIEWS: CPT
